# Patient Record
Sex: MALE | Race: WHITE | URBAN - NONMETROPOLITAN AREA
[De-identification: names, ages, dates, MRNs, and addresses within clinical notes are randomized per-mention and may not be internally consistent; named-entity substitution may affect disease eponyms.]

---

## 2017-01-01 ENCOUNTER — HISTORY (OUTPATIENT)
Dept: SURGERY | Facility: OTHER | Age: 72
End: 2017-01-01

## 2017-01-01 ENCOUNTER — SURGERY (OUTPATIENT)
Dept: SURGERY | Facility: OTHER | Age: 72
End: 2017-01-01

## 2017-01-01 ENCOUNTER — COMMUNICATION - GICH (OUTPATIENT)
Dept: SURGERY | Facility: OTHER | Age: 72
End: 2017-01-01

## 2017-01-01 ENCOUNTER — HOSPITAL ENCOUNTER (OUTPATIENT)
Dept: SURGERY | Facility: OTHER | Age: 72
Discharge: HOME | End: 2017-02-14
Attending: SURGERY | Admitting: SURGERY

## 2017-01-01 ENCOUNTER — AMBULATORY - GICH (OUTPATIENT)
Dept: EMERGENCY MEDICINE | Facility: OTHER | Age: 72
End: 2017-01-01

## 2017-01-01 DIAGNOSIS — D50.8 OTHER IRON DEFICIENCY ANEMIAS (CODE): ICD-10-CM

## 2017-01-01 DIAGNOSIS — E11.69 TYPE 2 DIABETES MELLITUS WITH OTHER SPECIFIED COMPLICATION (H): ICD-10-CM

## 2017-01-01 LAB
A/G RATIO - HISTORICAL: 1.5 (ref 1–2)
ALBUMIN SERPL-MCNC: 4 G/DL (ref 3.5–5.7)
ALP SERPL-CCNC: 88 IU/L (ref 34–104)
ALT (SGPT) - HISTORICAL: 10 IU/L (ref 7–52)
ANION GAP - HISTORICAL: 11 (ref 5–18)
AST SERPL-CCNC: 17 IU/L (ref 13–39)
BILIRUB SERPL-MCNC: 0.5 MG/DL (ref 0.3–1)
BUN SERPL-MCNC: 10 MG/DL (ref 7–25)
BUN/CREAT RATIO - HISTORICAL: 15
CALCIUM SERPL-MCNC: 9.5 MG/DL (ref 8.6–10.3)
CHLORIDE SERPLBLD-SCNC: 102 MMOL/L (ref 98–107)
CO2 SERPL-SCNC: 23 MMOL/L (ref 21–31)
CREAT SERPL-MCNC: 0.67 MG/DL (ref 0.7–1.3)
ERYTHROCYTE [DISTWIDTH] IN BLOOD BY AUTOMATED COUNT: 13.1 % (ref 11.5–15.5)
ESTIMATED AVERAGE GLUCOSE: 200 MG/DL
GFR IF NOT AFRICAN AMERICAN - HISTORICAL: >60 ML/MIN/1.73M2
GLOBULIN - HISTORICAL: 2.7 G/DL (ref 2–3.7)
GLUCOSE SERPL-MCNC: 132 MG/DL (ref 70–105)
HCT VFR BLD AUTO: 39.2 % (ref 37–53)
HEMOGLOBIN A1C MONITORING (POCT) - HISTORICAL: 8.6 % (ref 4–6.2)
HEMOGLOBIN: 13.3 G/DL (ref 13.5–17.5)
INR - HISTORICAL: 1.2
MCH RBC QN AUTO: 31.4 PG (ref 26–34)
MCHC RBC AUTO-ENTMCNC: 34 G/DL (ref 32–36)
MCV RBC AUTO: 92 FL (ref 80–100)
PLATELET # BLD AUTO: 220 THOU/CU MM (ref 140–440)
PMV BLD: 8.1 FL (ref 6.5–11)
POTASSIUM SERPL-SCNC: 4.4 MMOL/L (ref 3.5–5.1)
PROT SERPL-MCNC: 6.7 G/DL (ref 6.4–8.9)
PROTIME - HISTORICAL: 12.3 SEC (ref 11.9–15.2)
RED BLOOD COUNT - HISTORICAL: 4.24 MIL/CU MM (ref 4.3–5.9)
SODIUM SERPL-SCNC: 136 MMOL/L (ref 133–143)
WHITE BLOOD COUNT - HISTORICAL: 5.9 THOU/CU MM (ref 4.5–11)

## 2018-01-03 NOTE — OR ANESTHESIA
Patient Information     Patient Name MRN Sex     Walker Joshi [id number] Male 1945      OR Anesthesia by Amisha Coates CRNA at 2017 10:44 AM     Author:  Amisha Coates CRNA Service:  (none) Author Type:  NURS- Nurse Anesthetist     Filed:  2017 10:44 AM Date of Service:  2017 10:44 AM Status:  Signed     :  Amisha Coates CRNA (NURS- Nurse Anesthetist)            Anesthesia Post Operative Care Note    Name: Walker Joshi  MRN:   [id number]  :    1945       Procedure Done:  See Surgeon Note        Anesthesia Technique    Anesthetic Type:  MAC       MAC Type:  NC     Oral Trauma:  No    Intraoperative Course   Hemodynamics:  Stable    Ventilation Normal:  Yes Lung Sounds:  Normal      PACU Course    Airway Status:  Extubated     Nondepolarizer Used:       Reversed: N/A   Hemodynamics:  Stable      Hydration: Euvolemic   Temperature:  36.1 - 38.3      Mental Status:  Awake, alert, follows commands   Pain Management:  Adequate   Regional Block:  No   Anesthesia Complications:  None      Vital Signs:  Temp: 98.2  F (36.8  C)  Pulse: 79  BP: 117/83  Resp: 16  SpO2: 98 %                       Active Lines:  Patient Lines/Drains/Airways Status    Active Line     Name: Placement date: Placement time: Site: Days:    PERIPHERAL VAD Right Hand 17   1010   Hand   less than 1                Intake & Output:       Labs:  No results for input(s): IC9MPFMKLSY, HGK5ZRDURAHH, PHARTERIAL, QOE1LXRXVFYW, Q0NVORPGFLZU in the last 24 hours.    No results for input(s): MAGNESIUM in the last 24 hours.    No results for input(s): GLUCOSEMETER in the last 720 hours.        Amisha Coates CRNA ....................  2017   10:44 AM

## 2018-01-03 NOTE — H&P
Patient Information     Patient Name MRN Sex Walker Dacosta [id number] Male 1945      H&P by Cora Frye DO at 2017 10:59 AM     Author:  Cora Frye DO Service:  (none) Author Type:  PHYS- Osteopathic     Filed:  2017 10:59 AM Date of Service:  2017 10:59 AM Status:  Signed     :  Cora Frye DO (PHYS- Osteopathic)            Colonoscopy Consult     Walker Joshi  is seen at the request of Doctor Nonstaff  regarding colon cancer screening. Walker Joshi  has  had a colonoscopy before.  Has had polyps in the past.  Has had problems w/ diarrhea.  ? If was  From metformin?   No pain or difficulty with bowel movements. Denies rectal bleeding. There is family history of any colon cancer. Father had colon caner/ 3-4 uncles.  Has  had any weight loss. Appetite is good. No heart, lung, or kidney problems. No heartburn or indigestion. No prior colo-rectal surgery. No prior Prostate surgery . No problems with anesthesia in the past.    Patient has a history of :  DM : +  CVA/MI: no  CPAP use: no  Blood thinners: denies taking asa daily   Kidney disease: no   Patient needs to be MAC because of: Medical conditions and airway control.    Prior to Admission medications          Medication Sig Start Date End Date Taking? Last Dose Authorizing Provider   aspirin chewable (ASPIRIN CHILDRENS) 81 mg chewable tablet Take 81 mg by mouth once daily as needed for palpitations.    Past Week at Unknown time Reported, Patient   blood sugar diagnostic (BLOOD GLUCOSE TEST) strip Use 1 strip to test blood sugar once daily and each time as needed.     Reported, Patient   diphenhydrAMINE (BENADRYL) 50 mg capsule Take 50 mg by mouth at bedtime if needed.    2017 at 1900 Reported, Patient   DME Wheelchair. With leg rests. Physical deconditioning. 12 months. 16    Garrett Ladd MD   gabapentin (NEURONTIN) 100 mg capsule Take 200 mg by mouth 3 times daily.    2017 at 1700 Reported,  Patient   insulin aspart (NOVOLOG) 100 unit/mL solution for injection 150 - 199 2 units, 200 - 249 3 units, 250 - 299 5 units, 300 - 349 7 units, 350 or greater 10 units achs  Indications: Per sliding scale 6/9/16 2/12/2017 Clay Lynne MD   insulin glargine (LANTUS) 100 unit/mL injection Inject 20 Units subcutaneous before bedtime. 11/7/16 2/13/2017 at 1800 Alexy Barreto MD   LACTOSE-REDUCED FOOD (BOOST PLUS ORAL) Take 1 Can by mouth 3 times daily.    2/13/2017 at 700 Reported, Patient   lipase-protease-amylase (ZENPEP) 10,000-34,000 -55,000 unit Delayed-Release capsule Take 2 capsules by mouth 3 times daily with meals.    2/12/2017 at 1700 Reported, Patient   melatonin 3 mg tablet Take 3 mg by mouth at bedtime.    2/13/2017 at 1900 Reported, Patient   mirtazapine (REMERON) 30 mg tablet Take 45 mg by mouth at bedtime.    2/13/2017 at 1900 Reported, Patient   naproxen (NAPROSYN) 500 mg tablet Take 500 mg by mouth once daily if needed.    Past Month at Unknown time Reported, Patient   nicotine 14 mg/24 hr (NICODERM; HABITROL) 14 mg/24 hr patch Apply 1 Patch on dry, clean, hairless skin once daily. 11/7/16   More than one month ago at Unknown time Alexy Barreto MD   omeprazole (PRILOSEC) 20 mg Delayed-Release capsule Take 20 mg by mouth once daily before a meal.    2/13/2017 at 0700 Reported, Patient   prenatal vitamin-folic acid 1 mg (PRENATAL RX) tablet/capsule Take 1 tablet by mouth once daily. 11/7/16 2/13/2017 at 0700 Alexy Barreto MD   tamsulosin (FLOMAX) 0.4 mg capsule Take 0.4 mg by mouth at bedtime.    Past Month at Unknown time Reported, Patient   thiamine mononitrate, vit B1, (VITAMIN B1) 100 mg tablet Take 100 mg by mouth once daily.    Past Week at Unknown time Reported, Patient         Allergies     Allergen  Reactions     Glucophage [Metformin] Diarrhea         Past Medical History     Diagnosis  Date     Chronic alcoholic pancreatitis (HC)      Diabetes mellitus,  "type II (HC)      Dysphagia      History of ETOH abuse      Hypokalemia      Iron deficiency anemia due to dietary causes      Osteoarthritis      Severe protein-calorie malnutrition (HC)        Past Surgical History       Procedure   Laterality Date     Finger amputations left hand        thumb, index, 5th       Duputryen's contracture        Hernia repair          Social History     Social History        Marital status:       Spouse name: N/A     Number of children:  N/A     Years of education:  N/A     Occupational History      Not on file.     Social History Main Topics          Smoking status:   Current Every Day Smoker      Packs/day:  1.00      Years:  50.00      Smokeless tobacco:   Current User      Alcohol use   29.4 oz/week     49 Standard drinks or equivalent per week        Comment: drinks daily to maintain \"a level, not to get drunk\" Drinks everyday and sleeps only a couple hours then starts drinking again       Drug use:   No      Sexual activity:   Not on file      Other Topics  Concern     Not on file      Social History Narrative     Recently moved to [place] from [place]. Pt lives alone in an apartment.  x3. 4 children. Chronic etoh abuse. Tobacco 1 ppd.        No family history on file.      REVIEW OF SYSTEMS:      Four point ROS is done in HPI.  Pertinent positive and negatives are noted in HPI.              PHYSICAL EXAM    /83  Pulse 79  Temp 98.2  F (36.8  C)  Resp 16  Ht 1.803 m (5' 11\")  Wt 55.3 kg (122 lb)  SpO2 98%  BMI 17.02 kg/m2  Temp (24hrs), Av.2  F (36.8  C), Min:98.2  F (36.8  C), Max:98.2  F (36.8  C)    No data found.     No intake or output data in the 24 hours ending 17 1059          GENERAL APPEARANCE: Alert, healthy appearance, oriented, in no acute distress  HYDRATION: Well hydrated  HEAD, EYES, EARS, NECK, AND THROAT: Head is normocephalic, pupils equal, round, reactive to light and accommodation, ocular movement intact, sclera clear and " no jaundice. Dentition intact.  No TMJ issues.  NECK: Supple, no lymphadenopathy, no restriction to rom.   LUNGS: normal respiration, clear to auscultation  HEART: Regular rate and rhythm, normal heart sounds,   EXTREMITY: No edema or cyanosis,  Missing thumb on left.   ABDOMEN: soft and non-tender today. No hernias.  NEURO: CN: Intact.       Plan:Colonscopy        The pt did  do a bowel prep and has only clear yellowish material at this time.  The patient understands the importance of hydration even after. The patient understands there is a theuraretical risk of renal failure from a bowel prep. He should still drink plenty of fluids for the next 24 hours. Plavix and coumadin will need to be held except in unusual circumstances. Patient's in atrill Fib do not need to be bridged with Lovenox or on CVA prophylaxis. A baby ASA can be continued but full dose ASA needs to be stopped for 10 days prior to the procedure.  NSAID's should be stopped 5 days before the procedure  A complete H & P is required within 30 days of the procedure. MAC is used for the colonoscopy.     Colonoscopy does not require antibiotics prophylaxis.    Risks: Informed consent is obtained for the procedural (explained in simple layman's terms that the pt and/or family could understand) explaining risks vs benefits and alternatives to the procedure and consequences if we do not do the procedure and need/rational for the procedure.    Risks include but are not limited to: bleeding, infection, perforation of colon. This would necessitate emergency surgery to repair the damage w/ possible ostomy; and other associated complications w/ the required surgery. Also complications of anesthesia including aspiration, MI/CVA/death.    I discussed with the patient would they could expect during the procedure, post procedure and recovery time and risks. The patient understands that they need to have a ride home after the procedure. He understands that they should  not return to work or do any strenuous activity for 24 hours after the procedure.   The patient was given all this information in writing and expressed understanding.    Thank you for allowing me to participate in the care of this Patient. A copy of the Endoscopy report will be forwarded to your office. If there are any questions or concerns please feel free to contact my office     Cora Frye DO      CC:Doctor Nonstaff

## 2018-01-03 NOTE — OR ANESTHESIA
Patient Information     Patient Name MRN Sex     Walker Joshi [id number] Male 1945      OR Anesthesia by Amisha Coates CRNA at 2017  9:34 AM     Author:  Amisha Coates CRNA Service:  (none) Author Type:  NURS- Nurse Anesthetist     Filed:  2017  9:34 AM Date of Service:  2017  9:34 AM Status:  Signed     :  Amisha Coates CRNA (NURS- Nurse Anesthetist)                                                           ANESTHESIA ASSESSMENT    Date: 17 Time: 9:34 AM      Patient:  Walker Joshi    Procedure(s) (LRB):  COLONOSCOPY (N/A)    Past Medical History     Diagnosis  Date     Chronic alcoholic pancreatitis (HC)      Diabetes mellitus, type II (HC)      Dysphagia      History of ETOH abuse      Hypokalemia      Iron deficiency anemia due to dietary causes      Osteoarthritis      Severe protein-calorie malnutrition (HC)        Past Surgical History       Procedure   Laterality Date     Finger amputations left hand        thumb, index, 5th       Duputryen's contracture        Hernia repair          No family history on file.    Patient Active Problem List     Diagnosis  Code     Acute alcoholic intoxication in alcoholism with complication (HC) F10.229     Alcohol-induced chronic pancreatitis (HC) K86.0     Orthostatic hypotension I95.1     Type 2 diabetes mellitus (HC) E11.9     Tobacco abuse Z72.0     Hypomagnesemia E83.42     Hypophosphatemia E83.39     Iron deficiency anemia due to dietary causes D50.8     Hypocalcemia E83.51     Acute alcoholic pancreatitis K85.20     Hypoalbuminemia due to protein-calorie malnutrition (HC) E46     Severe protein-calorie malnutrition (HC) E43     Pulmonary nodule R91.1     Alcohol withdrawal syndrome without complication (HC) F10.230     Alcohol dependence with withdrawal (HC) F10.239     Physical deconditioning R53.81     Dysphagia R13.10     Hypokalemia E87.6     Weakness R53.1     Health care maintenance Z00.00       Prescriptions  Prior to Admission       Medication  Sig Dispense Refill     aspirin chewable (ASPIRIN CHILDRENS) 81 mg chewable tablet Take 81 mg by mouth once daily as needed for palpitations.       blood sugar diagnostic (BLOOD GLUCOSE TEST) strip Use 1 strip to test blood sugar once daily and each time as needed.       diphenhydrAMINE (BENADRYL) 50 mg capsule Take 50 mg by mouth at bedtime if needed.       DME Wheelchair. With leg rests. Physical deconditioning. 12 months. 1 Each 0     gabapentin (NEURONTIN) 100 mg capsule Take 200 mg by mouth 3 times daily.       insulin aspart (NOVOLOG) 100 unit/mL solution for injection 150 - 199 2 units, 200 - 249 3 units, 250 - 299 5 units, 300 - 349 7 units, 350 or greater 10 units achs  Indications: Per sliding scale  0     insulin glargine (LANTUS) 100 unit/mL injection Inject 20 Units subcutaneous before bedtime. 10 mL 0     LACTOSE-REDUCED FOOD (BOOST PLUS ORAL) Take 1 Can by mouth 3 times daily.       lipase-protease-amylase (ZENPEP) 10,000-34,000 -55,000 unit Delayed-Release capsule Take 2 capsules by mouth 3 times daily with meals.       melatonin 3 mg tablet Take 3 mg by mouth at bedtime.       mirtazapine (REMERON) 30 mg tablet Take 45 mg by mouth at bedtime.       naproxen (NAPROSYN) 500 mg tablet Take 500 mg by mouth once daily if needed.       nicotine 14 mg/24 hr (NICODERM; HABITROL) 14 mg/24 hr patch Apply 1 Patch on dry, clean, hairless skin once daily. 14 Patch 2     omeprazole (PRILOSEC) 20 mg Delayed-Release capsule Take 20 mg by mouth once daily before a meal.       prenatal vitamin-folic acid 1 mg (PRENATAL RX) tablet/capsule Take 1 tablet by mouth once daily. 30 tablet 2     tamsulosin (FLOMAX) 0.4 mg capsule Take 0.4 mg by mouth at bedtime.       thiamine mononitrate, vit B1, (VITAMIN B1) 100 mg tablet Take 100 mg by mouth once daily.         Allergies:  Allergies     Allergen  Reactions     Glucophage [Metformin] Diarrhea       Review of Systems:  GERD: Yes  "(controlled with omeprazole)  Chest pain: No  Shortness of breath: No  Recent fever: No  Poor exercise tolerance: No  Bleeding tendency: No  Pregnant: No  Anesthesia Complications: None      History     Smoking Status       Current Every Day Smoker      Packs/day: 1.00     Years: 50.00   Smokeless Tobacco       Current User      Social History     Social History        Marital status:       Spouse name: N/A     Number of children:  N/A     Years of education:  N/A     Social History Main Topics          Smoking status:   Current Every Day Smoker      Packs/day:  1.00      Years:  50.00      Smokeless tobacco:   Current User      Alcohol use   29.4 oz/week     49 Standard drinks or equivalent per week        Comment: drinks daily to maintain \"a level, not to get drunk\" Drinks everyday and sleeps only a couple hours then starts drinking again       Drug use:   No      Sexual activity:   Not on file      Other Topics  Concern     Not on file      Social History Narrative     Recently moved to [place] from [place]. Pt lives alone in an apartment.  x3. 4 children. Chronic etoh abuse. Tobacco 1 ppd.        Physical Examination:  Visit Vitals       Ht 1.803 m (5' 11\")     Wt 55.3 kg (122 lb)     BMI 17.02 kg/m2    Body mass index is 17.02 kg/(m^2). Body surface area is 1.66 meters squared.  Dental Condition: Fair (few missing teeth)     Mallampati Score (Airway): II  Cardiovascular: Normal  Pulmonary: Normal  Other: (not recorded)    Recent Labs in [facility]:    No results for input(s): SODIUM, POTASSIUM, CHLORIDE, [id number], ANIONGAP, BUN, CREATININE, BUNCREARATIO, CALCIUM, GLUCOSE, GLUCOSEMETER, KETONES, MAGNESIUM, WBC, HGB, HCT, PLT, ABORH, RHTYPE, PREGURINE, BHCGQL, HCGBETAQUANT, INR in the last 72 hours.          Assessment/Plan:  ASA Class: III  Risk of dental injury discussed: Yes  NPO status confirmed: Yes  Anesthetic Plan: MAC  Risk/Benefit/Alt discussed: Yes  Questions answered: Yes  Emergency " Case?: No  Labs/ECG/Radiology Reviewed?: Yes      H&P Reviewed.  Patient Examined.      Provider Electronic Signature:  Amisha Coates CRNA

## 2018-01-03 NOTE — TELEPHONE ENCOUNTER
Patient Information     Patient Name MRN Sex Walker Dacosta [id number] Male 1945      Telephone Encounter by Maylin Rey at 2017 11:25 AM     Author:  Maylin Rey Service:  (none) Author Type:  (none)     Filed:  2017 11:26 AM Encounter Date:  2017 Status:  Signed     :  Maylin Rey            See path results for colonoscopy.  Maylin Rey LPN..........2017  11:25 AM

## 2018-01-03 NOTE — PROCEDURES
Patient Information     Patient Name MRN Sex Walker Dacosta [id number] Male 1945      Procedures by Cora Frye DO at 2017 10:47 AM     Author:  Cora Frye DO Service:  (none) Author Type:  PHYS- Osteopathic     Filed:  2017 10:50 AM Date of Service:  2017 10:47 AM Status:  Signed     :  Cora Frye DO (PHYS- Osteopathic)            .Colonoscopy REPORT  ?  PRE-OPERATIVE DIAGNOSIS: diarrhea/wt loss/hx of polyps   POST-OPERATIVE DIAGNOSIS: polyps x2/diverticula/hemorrhoids     PROCEDURES: Colonoscopy  SURGEON: DENISSE Frye DO    ESTIMATED BLOOD LOSS: None     COMPLICATIONS: The patient tolerated the procedure well without complication.     INDICATIONS:   Walker Joshi  Is seen at the request of their Doctor Nonstaff, and is here today for a colonoscopy for     ICD-10-CM    1. Type 2 diabetes mellitus with other specified complication (HC) E11.69    2. Iron deficiency anemia due to dietary causes D50.8 sodium chloride 0.9% 5 mL syringe (NORMAL SALINE)      lidocaine (1%) injection 0.1-1 mL      lactated ringers infusion    . Informed consent was obtained, explaining the risks and benefits of the procedure, including but not limited to bleeding, infection, perforation, aspiration, complications from the anesthesia.   ?  DESCRIPTION OF PROCEDURE:   The patient was brought to the endoscopy suite and placed in left lateral decubitus position. Anesthesia was administered per the Department of Anesthesia, with constant monitoring of all vital signs. Digital rectal exam was performed prior to beginning the procedure and revealed external hemorrhoids grade II.  The prostate felt smooth.  The previously lubricated Olympus was inserted in the rectum and insufflation was begun. The scope was passed up through the recto-sigmoid valves, through the sigmoid and transverse, down the ascending and the cecum was achieved at 90 cm. POOR  prep was noted- lesions smaller than 1cm may have  been missed The scope was then withdrawn.  Patient had a polyp at 80 & 70 cm. It is removed with a cold biting forcep. All specimen(s) is retrieved. No bleeding is noted.    In the sigmoid colon, we encountered multiple small mouthed diverticula.  There are no signs of active bleeding or infection.     The patient tolerated the procedure without complicated and transferred to the recovery room in stable condition. Colonoscopy should be repeated in 5 years  time- pathology pending and if his health is still good.       Radha, DO

## 2018-01-03 NOTE — H&P
Patient Information     Patient Name MRN Sex Walker Dacosta [id number] Male 1945      H&P by Cora Frye DO at 2017 10:09 AM     Author:  Cora Frye DO Service:  (none) Author Type:  PHYS- Osteopathic     Filed:  2017 10:18 AM Date of Service:  2017 10:09 AM Status:  Signed     :  Cora Frye DO (PHYS- Osteopathic)            Colonoscopy Consult     Walker Joshi  is seen at the request of Doctor Nonstaff  regarding colon cancer screening. Walker Joshi  has  had a colonoscopy before.  Has had polyps in the past.  Has had problems w/ diarrhea.  ? If was  From metformin?   No pain or difficulty with bowel movements. Denies rectal bleeding. There is family history of any colon cancer. Father had colon caner/ 3-4 uncles.  Has  had any weight loss. Appetite is good. No heart, lung, or kidney problems. No heartburn or indigestion. No prior colo-rectal surgery. No prior Prostate surgery . No problems with anesthesia in the past.    Patient has a history of :  DM : +  CVA/MI: no  CPAP use: no  Blood thinners: denies taking asa daily   Kidney disease: no   Patient needs to be MAC because of: Medical conditions and airway control.    Prior to Admission medications          Medication Sig Start Date End Date Taking? Last Dose Authorizing Provider   aspirin chewable (ASPIRIN CHILDRENS) 81 mg chewable tablet Take 81 mg by mouth once daily as needed for palpitations.    Past Week at Unknown time Reported, Patient   blood sugar diagnostic (BLOOD GLUCOSE TEST) strip Use 1 strip to test blood sugar once daily and each time as needed.     Reported, Patient   diphenhydrAMINE (BENADRYL) 50 mg capsule Take 50 mg by mouth at bedtime if needed.    2017 at 1900 Reported, Patient   DME Wheelchair. With leg rests. Physical deconditioning. 12 months. 16    Garrett Ladd MD   gabapentin (NEURONTIN) 100 mg capsule Take 200 mg by mouth 3 times daily.    2017 at 1700 Reported,  Patient   insulin aspart (NOVOLOG) 100 unit/mL solution for injection 150 - 199 2 units, 200 - 249 3 units, 250 - 299 5 units, 300 - 349 7 units, 350 or greater 10 units achs  Indications: Per sliding scale 6/9/16 2/12/2017 Clay Lynne MD   insulin glargine (LANTUS) 100 unit/mL injection Inject 20 Units subcutaneous before bedtime. 11/7/16 2/13/2017 at 1800 Alexy Barreto MD   LACTOSE-REDUCED FOOD (BOOST PLUS ORAL) Take 1 Can by mouth 3 times daily.    2/13/2017 at 700 Reported, Patient   lipase-protease-amylase (ZENPEP) 10,000-34,000 -55,000 unit Delayed-Release capsule Take 2 capsules by mouth 3 times daily with meals.    2/12/2017 at 1700 Reported, Patient   melatonin 3 mg tablet Take 3 mg by mouth at bedtime.    2/13/2017 at 1900 Reported, Patient   mirtazapine (REMERON) 30 mg tablet Take 45 mg by mouth at bedtime.    2/13/2017 at 1900 Reported, Patient   naproxen (NAPROSYN) 500 mg tablet Take 500 mg by mouth once daily if needed.    Past Month at Unknown time Reported, Patient   nicotine 14 mg/24 hr (NICODERM; HABITROL) 14 mg/24 hr patch Apply 1 Patch on dry, clean, hairless skin once daily. 11/7/16   More than one month ago at Unknown time Alexy Barreto MD   omeprazole (PRILOSEC) 20 mg Delayed-Release capsule Take 20 mg by mouth once daily before a meal.    2/13/2017 at 0700 Reported, Patient   prenatal vitamin-folic acid 1 mg (PRENATAL RX) tablet/capsule Take 1 tablet by mouth once daily. 11/7/16 2/13/2017 at 0700 Alexy Barreto MD   tamsulosin (FLOMAX) 0.4 mg capsule Take 0.4 mg by mouth at bedtime.    Past Month at Unknown time Reported, Patient   thiamine mononitrate, vit B1, (VITAMIN B1) 100 mg tablet Take 100 mg by mouth once daily.    Past Week at Unknown time Reported, Patient         Allergies     Allergen  Reactions     Glucophage [Metformin] Diarrhea         Past Medical History     Diagnosis  Date     Chronic alcoholic pancreatitis (HC)      Diabetes mellitus,  "type II (HC)      Dysphagia      History of ETOH abuse      Hypokalemia      Iron deficiency anemia due to dietary causes      Osteoarthritis      Severe protein-calorie malnutrition (HC)        Past Surgical History       Procedure   Laterality Date     Finger amputations left hand        thumb, index, 5th       Duputryen's contracture        Hernia repair          Social History     Social History        Marital status:       Spouse name: N/A     Number of children:  N/A     Years of education:  N/A     Occupational History      Not on file.     Social History Main Topics          Smoking status:   Current Every Day Smoker      Packs/day:  1.00      Years:  50.00      Smokeless tobacco:   Current User      Alcohol use   29.4 oz/week     49 Standard drinks or equivalent per week        Comment: drinks daily to maintain \"a level, not to get drunk\" Drinks everyday and sleeps only a couple hours then starts drinking again       Drug use:   No      Sexual activity:   Not on file      Other Topics  Concern     Not on file      Social History Narrative     Recently moved to [place] from [place]. Pt lives alone in an apartment.  x3. 4 children. Chronic etoh abuse. Tobacco 1 ppd.        No family history on file.      REVIEW OF SYSTEMS:      Four point ROS is done in HPI.  Pertinent positive and negatives are noted in HPI.                  PHYSICAL EXAM    /83  Pulse 79  Temp 98.2  F (36.8  C)  Resp 16  Ht 1.803 m (5' 11\")  Wt 55.3 kg (122 lb)  SpO2 98%  BMI 17.02 kg/m2  Temp (24hrs), Av.2  F (36.8  C), Min:98.2  F (36.8  C), Max:98.2  F (36.8  C)    No data found.     No intake or output data in the 24 hours ending 17 1010          GENERAL APPEARANCE: Alert, healthy appearance, oriented, in no acute distress  HYDRATION: Well hydrated  HEAD, EYES, EARS, NECK, AND THROAT: Head is normocephalic, pupils equal, round, reactive to light and accommodation, ocular movement intact, sclera clear " and no jaundice. Dentition intact.  No TMJ issues.  NECK: Supple, no lymphadenopathy, no restriction to rom.   LUNGS: normal respiration, clear to auscultation  HEART: Regular rate and rhythm, normal heart sounds,   EXTREMITY: No edema or cyanosis,  Missing thumb on left.   ABDOMEN: soft and non-tender today. No hernias.  NEURO: CN: Intact.       Plan:Colonscopy        The pt did  do a bowel prep and has only clear yellowish material at this time.  The patient understands the importance of hydration even after. The patient understands there is a theuraretical risk of renal failure from a bowel prep. He should still drink plenty of fluids for the next 24 hours. Plavix and coumadin will need to be held except in unusual circumstances. Patient's in atrill Fib do not need to be bridged with Lovenox or on CVA prophylaxis. A baby ASA can be continued but full dose ASA needs to be stopped for 10 days prior to the procedure.  NSAID's should be stopped 5 days before the procedure  A complete H & P is required within 30 days of the procedure. MAC is used for the colonoscopy.     Colonoscopy does not require antibiotics prophylaxis.    Risks: Informed consent is obtained for the procedural (explained in simple layman's terms that the pt and/or family could understand) explaining risks vs benefits and alternatives to the procedure and consequences if we do not do the procedure and need/rational for the procedure.    Risks include but are not limited to: bleeding, infection, perforation of colon. This would necessitate emergency surgery to repair the damage w/ possible ostomy; and other associated complications w/ the required surgery. Also complications of anesthesia including aspiration, MI/CVA/death.    I discussed with the patient would they could expect during the procedure, post procedure and recovery time and risks. The patient understands that they need to have a ride home after the procedure. He understands that they  should not return to work or do any strenuous activity for 24 hours after the procedure.   The patient was given all this information in writing and expressed understanding.    Thank you for allowing me to participate in the care of this Patient. A copy of the Endoscopy report will be forwarded to your office. If there are any questions or concerns please feel free to contact my office     Cora Frye DO      CC:Doctor Nonstaff

## 2018-01-03 NOTE — PROGRESS NOTES
Patient Information     Patient Name MRN Sex Walker Dacosta [id number] Male 1945      Progress Notes by Cora Frye DO at 2017  6:20 PM     Author:  Cora Frye DO Service:  (none) Author Type:  PHYS- Osteopathic     Filed:  2017  6:20 PM Date of Service:  2017  6:20 PM Status:  Signed     :  Cora Frye DO (PHYS- Osteopathic)            Patient had x2 adenomas which are pre-cancerous lesions.  The colonoscopy should be repeated in 5 years time if the patient is healthy enough for anesthesia.    Of course, if the patient should ever notice any pain or difficulty have a bowel movement, rectal bleeding or unexplained weight loss, the patient  should seek medical care.

## 2018-01-26 ENCOUNTER — DOCUMENTATION ONLY (OUTPATIENT)
Dept: FAMILY MEDICINE | Facility: OTHER | Age: 73
End: 2018-01-26

## 2018-07-23 NOTE — PROGRESS NOTES
Patient Information     Patient Name  Walker Joshi MRN  [id number] Sex  Male   1945      Letter by Cora Frye DO at      Author:  Cora Frye DO Service:  (none) Author Type:  (none)    Filed:   Encounter Date:  2017 Status:  (Other)           Walker Joshi  [address]  [address] [facility] [address]          2017    Dear Mr. Joshi:    This letter is in regards to your colonoscopy that was done by Dr. Frye on 17.   The polyp(s) removed from your colon were TUBULAR ADENOMAS.  Adenomatous polyps are  pre-cancerous, yet BENIGN.  The polyp was removed. You have an increased risk for developing other polyps in the future.  For that reason, Dr. Frye recommends a repeat colonoscopy in 5 years unless you have problems.      Dr. Frye also recommends a high fiber diet. A fiber supplement such as Metamucil, FiberCon, or Citrucel is recommended. Generic forms of these supplements are fine. These are available over the counter.       If you have any questions regarding this report, please call the Surgery department at [phone]  A copy of this report will be placed in your electronic medical record for your primary care provider's review.    Sincerely,    General Surgery      Reviewed and electronically signed by provider.